# Patient Record
Sex: MALE | Race: WHITE | NOT HISPANIC OR LATINO | Employment: UNEMPLOYED | URBAN - NONMETROPOLITAN AREA
[De-identification: names, ages, dates, MRNs, and addresses within clinical notes are randomized per-mention and may not be internally consistent; named-entity substitution may affect disease eponyms.]

---

## 2017-01-11 ENCOUNTER — OFFICE VISIT (OUTPATIENT)
Dept: CARDIAC SURGERY | Facility: CLINIC | Age: 62
End: 2017-01-11

## 2017-01-11 VITALS
OXYGEN SATURATION: 98 % | HEART RATE: 73 BPM | SYSTOLIC BLOOD PRESSURE: 126 MMHG | HEIGHT: 73 IN | DIASTOLIC BLOOD PRESSURE: 79 MMHG | WEIGHT: 209.2 LBS | BODY MASS INDEX: 27.73 KG/M2

## 2017-01-11 DIAGNOSIS — J98.4 CAVITATING MASS IN RIGHT LOWER LUNG LOBE: Primary | ICD-10-CM

## 2017-01-11 DIAGNOSIS — Z72.0 TOBACCO ABUSE DISORDER: ICD-10-CM

## 2017-01-11 DIAGNOSIS — J18.9 PNEUMONIA DUE TO ORGANISM: ICD-10-CM

## 2017-01-11 PROCEDURE — 99213 OFFICE O/P EST LOW 20 MIN: CPT | Performed by: THORACIC SURGERY (CARDIOTHORACIC VASCULAR SURGERY)

## 2017-01-11 NOTE — LETTER
January 11, 2017     Samira Rodríguez MD  [address] [facility] [address]    Patient: Alvaro Gutierrez   YOB: 1955   Date of Visit: 1/11/2017       Dear Dr. Marcos MD:    Alvaro Gutierrez was in my office today. Below are the relevant portions of my assessment and plan of care.       Given lack of sx and subsequent decrease in size, will repeat CT in 3 mos  Smoking cessation   f/u after CT    Alvaro was seen today for lung nodule.    Diagnoses and all orders for this visit:    Cavitating mass in right lower lung lobe    Pneumonia due to organism    Tobacco abuse disorder                If you have questions, please do not hesitate to call me. I look forward to following Alvaro along with you.         Sincerely,        Josef Raza MD        CC: No Recipients

## 2017-01-11 NOTE — PROGRESS NOTES
Subjective   Patient ID: Alvaro Gutierrez is a 61 y.o. male is here today for follow-up.    History of Present Illness   This is a 61-year-old male with past medical history significant for hypertension, panic disorder, and COPD, who was transferred from [facility] due to chest pain and increasing troponin. Further workup demonstrated non ST elevated myocardial infarction. However, patient was found to have bilateral pneumonia and positive blood cultures with MRSA, AFib with rapid ventricular response and hypotension. He also had change in mental status with lethargy. Cardiology evaluated the patient, who felt that abnormal troponin was secondary to his acute medical illness. Considering he has been febrile at home and hypoxic, he has been under treatment for MRSA bacteremia and bilateral pneumonia, a CT scan was obtained, demonstrating a large cystic mass with central septation in the right lower lobe. Therefore, we were asked to see the patient for consideration for surgical intervention.Due to pts ongoing illness pt was subsequently d/c'd to LT[facility]. He now returns today for follow up. Pt denies SOB, cough or hemoptysis. He also denies wt loss          Review of Systems   Constitution: Negative for chills, decreased appetite, diaphoresis, fever, malaise/fatigue, night sweats and weight loss.   HENT: Negative.    Eyes: Negative.    Cardiovascular: Negative for chest pain and dyspnea on exertion.   Respiratory: Negative.    Endocrine: Negative.    Skin: Negative.    Musculoskeletal: Negative.    Gastrointestinal: Negative.    Genitourinary: Negative.    Neurological: Negative.    Psychiatric/Behavioral: Negative.         Objective   Physical Exam   Constitutional: He is oriented to person, place, and time. He appears well-developed and well-nourished.   HENT:   Head: Normocephalic and atraumatic.   Right Ear: External ear normal.   Left Ear: External ear normal.   Mouth/Throat: Oropharynx is clear  and moist.   Cardiovascular: Normal rate, regular rhythm, normal heart sounds and intact distal pulses.    Pulmonary/Chest: Effort normal. He has decreased breath sounds in the right lower field.   Musculoskeletal: Normal range of motion.   Neurological: He is alert and oriented to person, place, and time. He has normal reflexes.   Skin: He is not diaphoretic.   Nursing note and vitals reviewed.    Independent Review of Radiographic Studies:    CT revealed: Significant improvement. Residual 3.6 cm right lower lobe cystic or cavitary lesion. Minimal subsegmental infiltrate anteriorly and laterally in the right lower lobe. Resolution of the interstitial changes in the upper lobes. Development of a small right pleural effusion.         Assessment/Plan    Given lack of sx and subsequent decrease in size, will repeat CT in 3 mos  Smoking cessation   f/u after CT    Alvaro was seen today for lung nodule.    Diagnoses and all orders for this visit:    Cavitating mass in right lower lung lobe    Pneumonia due to organism    Tobacco abuse disorder

## 2017-03-17 DIAGNOSIS — R91.8 LUNG MASS: Primary | ICD-10-CM

## 2017-04-17 DIAGNOSIS — R06.02 SOB (SHORTNESS OF BREATH): Primary | ICD-10-CM

## 2017-04-25 ENCOUNTER — OFFICE VISIT (OUTPATIENT)
Dept: PULMONOLOGY | Facility: CLINIC | Age: 62
End: 2017-04-25

## 2017-04-25 VITALS
BODY MASS INDEX: 28.76 KG/M2 | WEIGHT: 217 LBS | HEIGHT: 73 IN | SYSTOLIC BLOOD PRESSURE: 120 MMHG | DIASTOLIC BLOOD PRESSURE: 82 MMHG

## 2017-04-25 DIAGNOSIS — R06.02 SHORTNESS OF BREATH: Primary | ICD-10-CM

## 2017-04-25 DIAGNOSIS — J44.9 CHRONIC OBSTRUCTIVE PULMONARY DISEASE, UNSPECIFIED COPD TYPE (HCC): Primary | ICD-10-CM

## 2017-04-25 DIAGNOSIS — J18.9 PNEUMONIA DUE TO ORGANISM: ICD-10-CM

## 2017-04-25 PROCEDURE — 99202 OFFICE O/P NEW SF 15 MIN: CPT | Performed by: INTERNAL MEDICINE

## 2017-04-25 PROCEDURE — 94010 BREATHING CAPACITY TEST: CPT | Performed by: INTERNAL MEDICINE

## 2017-04-25 RX ORDER — MELATONIN
1000 DAILY
COMMUNITY

## 2017-04-25 RX ORDER — ASPIRIN 81 MG/1
81 TABLET, CHEWABLE ORAL DAILY
COMMUNITY

## 2017-04-25 NOTE — PROGRESS NOTES
Subjective   Alvaro Gutierrez is a 61 y.o. male.   Chief complaint is shortness of breath  History of Present Illness   This gentleman has long-standing COPD and chronic back pain.  He is disabled from the  because of nerves and back pain.  He was hospitalized in October of last year with what was thought to be a cavitary pneumonia at that time.  He complains of cough wheeze shortness of breath and dyspnea on walking less than one city block.  He has a history of anxiety back pain and arthritis.  Medication allergies unknown.  Medications please see  s list.  Family history is positive for cancer. The following portions of the patient's history were reviewed and updated as appropriate: allergies, current medications, past family history, past medical history, past social history, past surgical history and problem list.    Review of Systems   Constitutional: Negative for activity change, chills, fatigue, fever and unexpected weight change.   HENT: Negative for congestion, dental problem, ear discharge, ear pain, facial swelling, hearing loss, nosebleeds, postnasal drip, rhinorrhea, sinus pressure, sneezing, sore throat, tinnitus, trouble swallowing and voice change.    Eyes: Negative.  Negative for photophobia, pain, discharge, redness, itching and visual disturbance.   Respiratory: Positive for cough, chest tightness, shortness of breath and wheezing.    Cardiovascular: Positive for palpitations. Negative for chest pain and leg swelling.   Gastrointestinal: Negative for abdominal distention, abdominal pain and vomiting.   Endocrine: Negative.  Negative for cold intolerance, heat intolerance, polydipsia and polyphagia.   Genitourinary: Negative.  Negative for decreased urine volume, dysuria, enuresis, flank pain, frequency, hematuria and urgency.   Musculoskeletal: Positive for arthralgias. Negative for back pain, gait problem, joint swelling, myalgias and neck pain.   Skin: Negative for color change,  pallor, rash and wound.   Allergic/Immunologic: Negative.  Negative for environmental allergies, food allergies and immunocompromised state.   Neurological: Negative.  Negative for dizziness, tremors, seizures, syncope, facial asymmetry, speech difficulty, weakness, light-headedness, numbness and headaches.   Hematological: Negative for adenopathy. Does not bruise/bleed easily.   Psychiatric/Behavioral: Positive for dysphoric mood. Negative for agitation, behavioral problems, confusion, decreased concentration, hallucinations and self-injury. The patient is not hyperactive.    All other systems reviewed and are negative.      Objective   Physical Exam   Constitutional: He appears well-developed and well-nourished. No distress.   HENT:   Head: Normocephalic.   Mouth/Throat: No oropharyngeal exudate.   Eyes: Conjunctivae are normal. Pupils are equal, round, and reactive to light. Right eye exhibits no discharge. Left eye exhibits no discharge. No scleral icterus.   Neck: Normal range of motion. Neck supple. No JVD present. No tracheal deviation present. No thyromegaly present.   Cardiovascular: Normal rate, regular rhythm, normal heart sounds and intact distal pulses.  Exam reveals no gallop and no friction rub.    No murmur heard.  Pulmonary/Chest: He is in respiratory distress. He has wheezes. He has rales. He exhibits no tenderness.   Abdominal: Bowel sounds are normal. He exhibits no distension and no mass. There is no tenderness. There is no guarding.   Musculoskeletal: He exhibits no tenderness or deformity.   Lymphadenopathy:     He has no cervical adenopathy.   Neurological: He is alert. He has normal reflexes. He displays normal reflexes. No cranial nerve deficit. He exhibits normal muscle tone. Coordination normal.   Skin: Skin is warm and dry. No rash noted. He is not diaphoretic. No pallor.   Psychiatric: He has a normal mood and affect. His behavior is normal. Judgment and thought content normal.        Assessment/Plan   Alvaro was seen today for breathing problem.    Diagnoses and all orders for this visit:    Chronic obstructive pulmonary disease, unspecified COPD type  -     Spirometry Without Bronchodilator    Pneumonia due to organism      Chest x-ray reveals increased markings bilaterally with a scar in the right lung base.  Some fibrosis is noted, O2 saturation 100%, spirometry is near normal with minimal obstruction    Impression minimal COPD, pulmonary scarring, preserved oxygenation    Recommendations continue breathing medications, refrain from smoking, return in a couple months

## 2018-05-31 ENCOUNTER — TRANSCRIBE ORDERS (OUTPATIENT)
Dept: PHYSICAL THERAPY | Facility: HOSPITAL | Age: 63
End: 2018-05-31

## 2018-05-31 DIAGNOSIS — M25.552 LEFT HIP PAIN: Primary | ICD-10-CM

## 2018-06-07 ENCOUNTER — HOSPITAL ENCOUNTER (OUTPATIENT)
Dept: PHYSICAL THERAPY | Facility: HOSPITAL | Age: 63
Setting detail: THERAPIES SERIES
Discharge: HOME | End: 2018-06-07

## 2018-06-07 DIAGNOSIS — M25.552 LEFT HIP PAIN: Primary | ICD-10-CM

## 2018-06-07 PROCEDURE — 97162 PT EVAL MOD COMPLEX 30 MIN: CPT | Performed by: PHYSICAL THERAPIST

## 2018-06-07 PROCEDURE — 97110 THERAPEUTIC EXERCISES: CPT | Performed by: PHYSICAL THERAPIST

## 2018-06-07 NOTE — THERAPY EVALUATION
Outpatient Physical Therapy Ortho Initial Evaluation  [facility]  Marcelina Astorga, PT, DPT, CSCS       Patient Name: Alvaro Gutierrez  : 1955  MRN: [id number]  Today's Date: 2018      Visit Date: 2018     Pt reports 1/10 pain pre treatment, 2/10 pain post treatment  Reports N/A% of improvement.  Attended  visits.  Insurance available: 12 visits  Next MD appt: MARY .  Recertification: 2018.    Patient Active Problem List   Diagnosis   • Pneumonia due to organism   • Tobacco abuse disorder   • Cavitating mass in right lower lung lobe        Past Medical History:   Diagnosis Date   • COPD (chronic obstructive pulmonary disease)         Past Surgical History:   Procedure Laterality Date   • WRIST SURGERY Right     ORIF due to fracture       Visit Dx:     ICD-10-CM ICD-9-CM   1. Left hip pain M25.552 719.45     Number of days off work: None    Patient is .    Medications (Admitted on 2018)     albuterol (ACCUNEB) 1.25 MG/3ML nebulizer solution     aspirin 81 MG chewable tablet     cholecalciferol (VITAMIN D3) 1000 UNITS tablet     diazepam (VALIUM) 5 MG tablet     divalproex (DEPAKOTE) 250 MG 24 hr tablet     morphine (MS CONTIN) 60 MG 12 hr tablet     oxyCODONE ER (OxyCONTIN) 15 MG tablet extended-release 12 hour     Phenylephrine-DM-GG (DESGEN DM PO)          Allergies: None          Patient History     Row Name 18 1400             History    Chief Complaint Pain  -AJ      Type of Pain Hip pain  -AJ      Date Current Problem(s) Began --   4-6 months  -AJ      Brief Description of Current Complaint Patient reports that he had no injury that he just woke up with some pain.  He reports initially they thought he had cancer, but that came back negative after multiple tests and it was decided he just needed a hip replacement.   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 Yes, ortho x2, in [facility] and [place]  -AJ      What clinical tests have you had for this problem? X-ray;MRI;Bone Density  -AJ      Results of Clinical Tests Patient reprots he was told he had the hip of an 85 year old.   -AJ      Related/Recent Hospitalizations No  -AJ      History of Previous Related Injuries None that can recall  -AJ         Pain     Pain Location Hip  -AJ      Pain at Present 1  -AJ      Pain at Best 1  -AJ      Pain at Worst 8  -AJ      Pain Frequency Constant/continuous  -AJ      Pain Description Aching;Throbbing  -AJ      What Performance Factors Make the Current Problem(s) WORSE? getting in/out of the car; walking too much, pivoting  -AJ      What Performance Factors Make the Current Problem(s) BETTER? rest  -AJ      Is your sleep disturbed? Yes  -AJ      Is medication used to assist with sleep? Yes  -AJ      What position do you sleep in? Right sidelying;Supine  -AJ      Difficulties at work? getting in/out of the car or standing too much  -AJ      Difficulties with ADL's? putting on shoes/socks  -AJ      Difficulties with recreational activities? doing volunteer work.  -AJ        User Key  (r) = Recorded By, (t) = Taken By, (c) = Cosigned By    Initials Name Provider Type    AJ Marcelina Astorga PT Physical Therapist                PT Ortho     Row Name 06/07/18 1400       Subjective Comments    Subjective Comments Patient wants to get his core and hip strength up before he has hip replacement surgery later this year.  -AJ       Precautions and Contraindications    Precautions/Limitations no known precautions/limitations  -AJ       Subjective Pain    Able to rate subjective pain? yes   -AJ    Pre-Treatment Pain Level 1  -AJ    Post-Treatment Pain Level 2  -AJ       Posture/Observations    Posture- WNL Posture is WNL   for B LEs  -AJ    Posture/Observations Comments No acute distress, poor overall postural awareness. mild limp with gait.  -AJ       Hip Special Tests    Trendelenberg sign (gluteus medius weakness) Bilateral:;Negative  -AJ    REBECCA (hip vs SI pathology) Left:;Positive;Right:;Negative  -AJ    Mikel test (tightness of ITB) Bilateral:;Negative  -AJ    Leonard’s test (tightness of ITB) Bilateral:;Negative  -AJ    Hip scour test (labral vs hip pathology) Left:;Positive;Right:;Negative  -AJ    Piriformis test (piriformis syndrome) Bilateral:;Negative  -AJ    Rosado test (labral tear) Bilateral:;Negative  -AJ       Leg Length Test    Apparent Equal  -AJ       Left Lower Ext    Lt Hip ABduction AROM 32°  -AJ    Lt Hip ADduction AROM 17°  -AJ    Lt Hip Extension AROM 5°  -AJ    Lt Hip Flexion AROM 82°  -AJ    Lt Hip External Rotation AROM 25°  -AJ    Lt Hip Internal Rotation AROM 15°  -AJ       MMT (Manual Muscle Testing)    Additional Documentation General Assessment (Manual Muscle Testing) (Group)  -       General Assessment (Manual Muscle Testing)    Comment, General Manual Muscle Testing (MMT) Assessment B LE 5/5, except L hip flexion, abduction, hamstrings 4+/5 (L hip flexion, abduction, ext with increase in L hip pain)  -       Sensation    Sensation WNL? WNL  -AJ    Additional Comments Denies any numbness or tingling.  -AJ       Pathomechanics    Lower Extremity Pathomechanics Antalgic with midstance  -       Transfers    Comment (Transfers) I with all transfers.  -AJ       Gait/Stairs Assessment/Training    Comment (Gait/Stairs) FWB, non-antalgic gait, no distress.  -AJ      User Key  (r) = Recorded By, (t) = Taken By, (c) = Cosigned By    Initials Name Provider Type    MAVERICK Astorga PT Physical Therapist                      Therapy Education  Given: HEP,  Symptoms/condition management, Pain management, Posture/body mechanics, Mobility training (POC)  Program: New  How Provided: Verbal, Demonstration, Written  Provided to: Patient  Level of Understanding: Verbalized, Demonstrated           PT OP Goals     Row Name 06/07/18 1400          PT Short Term Goals    STG Date to Achieve 06/28/18  -     STG 1 I with HEP and have addtions/changes by next recertification.  -     STG 2 Patient able to ambulate aquatically F/R/L 5 min each with no increase in pain.  -     STG 3 AROM L hip flexion >= 90°.  -     STG 4 AROm L hip ER, functionally able to cross one leg over to karin sitting.  -     STG 5 AROM L hip extension >= 15°.  -        Long Term Goals    LTG Date to Achieve 07/20/18  -     LTG 1 Patient able to perform 15 reps of each aquatic ther ex with no increase in pain.  -     LTG 2 Patient able to perform 3 min each of deep water flutter kick and scissors with no increase in pain.  -     LTG 3 Patient able ot ambulate 300' non-antalgically.  -     LTG 4 Patient able to perform 20 PBall bridges with UE X for improved core stability with no increase in pain.  -     LTG 5 B LE 5/5, with no increase in pain.  -     LTG 6 I with all aquatics.  -     LTG 7 I with land final HEP.  -     LTG 8 D/C with final HEP and free 30 day fitness formulamembership.  -        Time Calculation    PT Goal Re-Cert Due Date 06/28/18  -       User Key  (r) = Recorded By, (t) = Taken By, (c) = Cosigned By    Initials Name Provider Type    MAVERICK Astorga, NAFISA Physical Therapist         Barriers to Rehab: Include significant or possible arthritic/degenerative changes that have occurred within the joint, The patient's obesity.    Safety Issues: None noted.          PT Assessment/Plan     Row Name 06/07/18 1400          PT Assessment    Functional Limitations Impaired gait;Limitation in home management;Limitations in community activities;Performance in  leisure activities;Performance in self-care ADL;Performance in work activities  -     Impairments Balance;Endurance;Gait;Impaired flexibility;Impaired muscle endurance;Impaired muscle length;Impaired muscle power;Range of motion;Pain;Muscle strength;Joint mobility;Joint integrity;Motor function  -     Assessment Comments Patient had increased pain with forward propulsion of Pro II, but was able to go retro with no increase in pain.. patient given written copy of HEP exercises.  -AJ     Rehab Potential Fair  -AJ     Patient/caregiver participated in establishment of treatment plan and goals Yes  -AJ     Patient would benefit from skilled therapy intervention Yes  -AJ        PT Plan    PT Frequency 2x/week  -AJ     Predicted Duration of Therapy Intervention (OT Eval) 3-5 weeks, 6-10 visits  -AJ     Planned CPT's? PT EVAL MOD COMPLELITY: 54268;PT RE-EVAL: 52130;PT THER PROC EA 15 MIN: 24091;PT THER ACT EA 15 MIN: 15345;PT MANUAL THERAPY EA 15 MIN: 65737;PT ELECTRICAL STIM UNATTEND: ;PT THER SUPP EA 15 MIN  -     Physical Therapy Interventions (Optional Details) aquatics exercise;balance training;gait training;gross motor skills;home exercise program;joint mobilization;lumbar stabilization;manual therapy techniques;modalities;ROM (Range of Motion);patient/family education;strengthening;stretching  -     PT Plan Comments Progress overall strength and endurance as able.  -AJ       User Key  (r) = Recorded By, (t) = Taken By, (c) = Cosigned By    Initials Name Provider Type    AJ Marcelina Astorga, PT Physical Therapist       Other therapeutic activities and/or exercises will be prescribed depending on the patients progress or lack there of.          Modalities     Row Name 06/07/18 1400             Ice    Ice Applied Yes  -AJ      Location L hip  -AJ      Rx Minutes 15 mins   Left after 5 minutes to take the ice to go.  -AJ      Ice S/P Rx Yes  -AJ        User Key  (r) = Recorded By, (t) = Taken By, (c) =  "Cosigned By    Initials Name Provider Type    MAVERICK Astorga, PT Physical Therapist              Exercises     Row Name 06/07/18 1400             Subjective Comments    Subjective Comments Patient wants to get his core and hip strength up before he has hip replacement surgery later this year.  -AJ         Subjective Pain    Able to rate subjective pain? yes  -AJ      Pre-Treatment Pain Level 1  -AJ      Post-Treatment Pain Level 2  -AJ         Exercise 1    Exercise Name 1 Pro II LE  -AJ      Time 1 10 minutes  -AJ      Additional Comments L 3.0- Retro, Fwd caused increase in pain.  -AJ         Exercise 2    Exercise Name 2 B St. HS S  -AJ      Reps 2 2  -AJ      Time 2 30 seconds  -AJ         Exercise 3    Exercise Name 3 Bridges  -AJ      Sets 3 2  -AJ      Reps 3 10  -AJ      Time 3 5\" hold  -AJ         Exercise 4    Exercise Name 4 Prone hip IR/ER  -AJ      Sets 4 1  -AJ      Reps 4 10  -AJ      Additional Comments to comfort  -AJ         Exercise 5    Exercise Name 5 GENNY- hip flexion S  -AJ      Time 5 5 minutes  -AJ        User Key  (r) = Recorded By, (t) = Taken By, (c) = Cosigned By    Initials Name Provider Type    MAVERICK Astorga, NAFISA Physical Therapist                        Outcome Measure Options: Lower Extremity Functional Scale (LEFS)  Lower Extremity Functional Index  Any of your usual work, housework or school activities: Moderate difficulty  Your usual hobbies, recreational or sporting activities: A little bit of difficulty  Getting into or out of the bath: Moderate difficulty  Walking between rooms: A little bit of difficulty  Putting on your shoes or socks: A little bit of difficulty  Squatting: Quite a bit of difficulty  Lifting an object, like a bag of groceries from the floor: Moderate difficulty  Performing light activities around your home: Moderate difficulty  Performing heavy activities around your home: Extreme difficulty or unable to perform activity  Getting into or out " of a car: Moderate difficulty  Walking 2 blocks: Moderate difficulty  Walking a mile: Extreme difficulty or unable to perform activity  Going up or down 10 stairs (about 1 flight of stairs): Quite a bit of difficulty  Standing for 1 hour: Extreme difficulty or unable to perform activity  Sitting for 1 hour: Extreme difficulty or unable to perform activity  Running on even ground: Extreme difficulty or unable to perform activity  Running on uneven ground: Extreme difficulty or unable to perform activity  Making sharp turns while running fast: Extreme difficulty or unable to perform activity  Hopping: Extreme difficulty or unable to perform activity  Rolling over in bed: Quite a bit of difficulty  Total: 24      Time Calculation:   Start Time: 1431  Stop Time: 1537  Time Calculation (min): 66 min  PT Non-Billable Time (min): 5 min     Therapy Charges for Today     Code Description Service Date Service Provider Modifiers Qty    77369838607 HC PT THER PROC EA 15 MIN 6/7/2018 Marcelina Astorga, PT GP 2    96897299397 HC PT EVAL MOD COMPLEXITY 3 6/7/2018 Marcelina Astorga, PT GP 1    94245369906 HC PT THER SUPP EA 15 MIN 6/7/2018 Marcelina Astorga, PT GP 1          PT G-Codes  Outcome Measure Options: Lower Extremity Functional Scale (LEFS)         Marcelina Astorga, PT, DPT, CSCS  6/7/2018

## 2018-06-12 ENCOUNTER — APPOINTMENT (OUTPATIENT)
Dept: PHYSICAL THERAPY | Facility: HOSPITAL | Age: 63
End: 2018-06-12

## 2018-09-10 ENCOUNTER — DOCUMENTATION (OUTPATIENT)
Dept: PHYSICAL THERAPY | Facility: HOSPITAL | Age: 63
End: 2018-09-10

## 2018-09-10 DIAGNOSIS — M25.552 LEFT HIP PAIN: Primary | ICD-10-CM

## 2018-10-04 DIAGNOSIS — M25.552 LEFT HIP PAIN: Primary | ICD-10-CM

## 2018-10-17 ENCOUNTER — OFFICE VISIT (OUTPATIENT)
Dept: ORTHOPEDIC SURGERY | Facility: CLINIC | Age: 63
End: 2018-10-17

## 2018-10-17 VITALS — HEIGHT: 73 IN | BODY MASS INDEX: 28.36 KG/M2 | WEIGHT: 214 LBS

## 2018-10-17 DIAGNOSIS — M25.552 LEFT HIP PAIN: Primary | ICD-10-CM

## 2018-10-17 DIAGNOSIS — M87.052 AVASCULAR NECROSIS OF BONE OF LEFT HIP (HCC): ICD-10-CM

## 2018-10-17 PROCEDURE — 99203 OFFICE O/P NEW LOW 30 MIN: CPT | Performed by: ORTHOPAEDIC SURGERY

## 2018-10-17 RX ORDER — ATORVASTATIN CALCIUM 40 MG/1
TABLET, FILM COATED ORAL
COMMUNITY

## 2018-10-17 RX ORDER — SERTRALINE HYDROCHLORIDE 100 MG/1
TABLET, FILM COATED ORAL
COMMUNITY

## 2018-10-17 RX ORDER — BUDESONIDE AND FORMOTEROL FUMARATE DIHYDRATE 160; 4.5 UG/1; UG/1
2 AEROSOL RESPIRATORY (INHALATION)
COMMUNITY

## 2018-10-17 RX ORDER — SILDENAFIL 100 MG/1
TABLET, FILM COATED ORAL
COMMUNITY

## 2018-10-17 RX ORDER — DIVALPROEX SODIUM 250 MG/1
250 TABLET, EXTENDED RELEASE ORAL
COMMUNITY

## 2018-10-17 NOTE — PROGRESS NOTES
Alvaro Gutierrez is a 63 y.o. male   Primary provider:  Samira Rodríguez MD       Chief Complaint   Patient presents with   • Left Hip - Pain       HISTORY OF PRESENT ILLNESS: Patient is here today for left hip pain. Patient brought disc with MRI and bone scan results. Patient was sent to [facility] upon arrival.  It's been going on for a year.  [facility] Dr. Nighat Humphries back in April of this year.  He essentially has AVN and really has had some physical therapy for some anti-inflammatory and activity modification.  He has a back issue has a pretty high dose of the morphine and OxyContin.  It doesn't hurt him on a daily basis today is actually a good day.    History of Present Illness     CONCURRENT MEDICAL HISTORY:    Past Medical History:   Diagnosis Date   • COPD (chronic obstructive pulmonary disease) (CMS/[facility])        No Known Allergies      Current Outpatient Prescriptions:   •  albuterol (ACCUNEB) 1.25 MG/3ML nebulizer solution, Take 1 ampule by nebulization Every 6 (Six) Hours As Needed for wheezing., Disp: , Rfl:   •  aspirin 81 MG chewable tablet, Chew 81 mg Daily., Disp: , Rfl:   •  atorvastatin (LIPITOR) 40 MG tablet, Take  by mouth., Disp: , Rfl:   •  budesonide-formoterol (SYMBICORT) 160-4.5 MCG/ACT inhaler, Inhale 2 puffs., Disp: , Rfl:   •  cholecalciferol (VITAMIN D3) 1000 UNITS tablet, Take 1,000 Units by mouth Daily., Disp: , Rfl:   •  diazepam (VALIUM) 5 MG tablet, Take 5 mg by mouth 2 (Two) Times a Day As Needed for anxiety., Disp: , Rfl:   •  divalproex (DEPAKOTE) 250 MG 24 hr tablet, Take 250 mg by mouth Every Night., Disp: , Rfl:   •  divalproex (DEPAKOTE) 250 MG 24 hr tablet, Take 250 mg by mouth., Disp: , Rfl:   •  morphine (MS CONTIN) 60 MG 12 hr tablet, Take 60 mg by mouth 2 (Two) Times a Day., Disp: , Rfl:   •  oxyCODONE ER (OxyCONTIN) 15 MG tablet extended-release 12 hour, Take 15 mg by mouth Every 6 (Six) Hours As Needed for moderate pain (4-6)., Disp: , Rfl:  "  •  Phenylephrine-DM-GG (DESGEN DM PO), Take  by mouth Every 4 (Four) Hours As Needed., Disp: , Rfl:   •  sertraline (ZOLOFT) 100 MG tablet, Take  by mouth., Disp: , Rfl:   •  sildenafil (VIAGRA) 100 MG tablet, Take  by mouth., Disp: , Rfl:     Past Surgical History:   Procedure Laterality Date   • WRIST SURGERY Right 2014    ORIF due to fracture       No family history on file.    Social History     Social History   • Marital status:      Spouse name: N/A   • Number of children: N/A   • Years of education: N/A     Occupational History   • Not on file.     Social History Main Topics   • Smoking status: Current Every Day Smoker     Packs/day: 1.50     Years: 42.00     Types: Cigarettes   • Smokeless tobacco: Never Used   • Alcohol use No   • Drug use: Unknown   • Sexual activity: Defer     Other Topics Concern   • Not on file     Social History Narrative   • No narrative on file        Review of Systems   Constitutional: Positive for activity change. Negative for chills and fever.   HENT: Negative for facial swelling.    Eyes: Negative for photophobia.   Respiratory: Negative for apnea and shortness of breath.    Cardiovascular: Negative for chest pain and leg swelling.   Gastrointestinal: Negative for abdominal pain, nausea and vomiting.   Genitourinary: Negative for dysuria.   Musculoskeletal: Positive for arthralgias, back pain, gait problem and joint swelling.   Skin: Negative for color change and rash.   Neurological: Negative for seizures and syncope.   Psychiatric/Behavioral: Negative for behavioral problems and dysphoric mood.       PHYSICAL EXAMINATION:       Ht 185.4 cm (73\")   Wt 97.1 kg (214 lb)   BMI 28.23 kg/m²     Physical Exam   Constitutional: He is oriented to person, place, and time. He appears well-developed and well-nourished.   HENT:   Head: Normocephalic and atraumatic.   Eyes: Pupils are equal, round, and reactive to light. EOM are normal.   Neck: Neck supple. No tracheal deviation " present.   Pulmonary/Chest: Effort normal.   Musculoskeletal: He exhibits tenderness. He exhibits no edema or deformity.   Neurological: He is alert and oriented to person, place, and time.   Skin: Skin is warm and dry. No erythema.   Psychiatric: He has a normal mood and affect.   Vitals reviewed.      GAIT:     [x]  Normal  []  Antalgic    Assistive device: [x]  None  []  Walker     []  Crutches  []  Cane     []  Wheelchair  []  Stretcher    Ortho Exam  Flexion and internal rotation reproduces pain on the left side.  Straight raising is negative he is neurologically intact.  Mild tenderness trochanteric flare left.        No results found.  X-ray shows chronic changes and changes at the head neck junction bilateral femur.  Consistent with AVN.      ASSESSMENT:    Diagnoses and all orders for this visit:    Left hip pain    Avascular necrosis of bone of left hip (CMS/HCC)    Other orders  -     atorvastatin (LIPITOR) 40 MG tablet; Take  by mouth.  -     budesonide-formoterol (SYMBICORT) 160-4.5 MCG/ACT inhaler; Inhale 2 puffs.  -     divalproex (DEPAKOTE) 250 MG 24 hr tablet; Take 250 mg by mouth.  -     sildenafil (VIAGRA) 100 MG tablet; Take  by mouth.  -     sertraline (ZOLOFT) 100 MG tablet; Take  by mouth.          PLAN reviewed the notes from Dr. Humphries in April as well as examine the patient elected the studies.  At this point I think she has intercondylar really doesn't need anything done.  I will be happy to see him at any point.  Dr. Humphries also mentioned the lesion up in his ilium which is 1.5 cm she did not think that that was associated anything causing his pain.  I would agree with this is his pain is intermittent but some days he doesn't have pain.  It may be a good idea to repeat a scan in January of this year and he'll just to prove that nothing is changed at all object with the lesion in the ilium.  I discussed this with him I'll be happy to see him back at any time.    No Follow-up on file.    Marcos  YESSENIA Gutierrez MD